# Patient Record
Sex: MALE | Race: WHITE | NOT HISPANIC OR LATINO | Employment: OTHER | URBAN - METROPOLITAN AREA
[De-identification: names, ages, dates, MRNs, and addresses within clinical notes are randomized per-mention and may not be internally consistent; named-entity substitution may affect disease eponyms.]

---

## 2018-01-25 ENCOUNTER — OFFICE VISIT (OUTPATIENT)
Dept: NEUROSURGERY | Facility: CLINIC | Age: 53
End: 2018-01-25

## 2018-01-25 VITALS — HEIGHT: 67 IN

## 2018-01-25 DIAGNOSIS — M80.00XA OSTEOPOROSIS WITH CURRENT PATHOLOGICAL FRACTURE, UNSPECIFIED OSTEOPOROSIS TYPE, INITIAL ENCOUNTER: Primary | ICD-10-CM

## 2018-01-25 PROBLEM — M48.54XD: Status: ACTIVE | Noted: 2018-01-25

## 2018-01-25 PROCEDURE — 99243 OFF/OP CNSLTJ NEW/EST LOW 30: CPT | Performed by: NEUROLOGICAL SURGERY

## 2018-01-25 RX ORDER — LEVOCETIRIZINE DIHYDROCHLORIDE 5 MG/1
TABLET, FILM COATED ORAL
COMMUNITY
Start: 2018-01-17

## 2018-01-25 RX ORDER — OXYCODONE HYDROCHLORIDE AND ACETAMINOPHEN 5; 325 MG/1; MG/1
TABLET ORAL
COMMUNITY
Start: 2018-01-22 | End: 2018-03-20

## 2018-01-25 NOTE — PROGRESS NOTES
Subjective   Patient ID: Jeet Bustillo is a 52 y.o. male is being seen for consultation today at the request of Ivelisse Ferguson*  Chief Complaint: Back pain    History of Present Illness: The patient is a 52-year-old man from [place] who was lifting a ladder about 2-1/2 months ago when he developed sudden pain in his back.  2 weeks later and MRI scan of his lumbar spine showed a superior aspect compression fracture of T12 with minimal loss of body height no more than 10 or 15%.  Pain is been in his back and radiates to the right flank.  It is much better than it was when it began.  He had diagnostic studies for osteoporosis and was found to have significant osteoporosis for reasons unknown, and for which she is referred to rheumatology to be seen tomorrow.  He had x-rays of his lumbar spine about a month after the event which I do not have access to, but apparently there was no finding of a progressive kyphosis.    Review of Radiographic Studies:  Lumbar MRI scan on 11/28/17 shows a minor superior vertebral body compression fracture at T12.    The following portions of the patient's history were reviewed, updated as appropriate and approved: allergies, current medications, past family history, past medical history, past social history, past surgical history, review of systems and problem list.  Review of Systems   Constitutional: Negative for activity change, appetite change, chills, diaphoresis, fatigue, fever and unexpected weight change.   HENT: Negative for congestion, dental problem, drooling, ear discharge, ear pain, facial swelling, hearing loss, mouth sores, nosebleeds, postnasal drip, rhinorrhea, sinus pressure, sneezing, sore throat, tinnitus, trouble swallowing and voice change.    Eyes: Negative for photophobia, pain, discharge, redness, itching and visual disturbance.   Respiratory: Negative for apnea, cough, choking, chest tightness, shortness of breath, wheezing and stridor.    Cardiovascular:  Negative for chest pain, palpitations and leg swelling.   Gastrointestinal: Negative for abdominal distention, abdominal pain, anal bleeding, blood in stool, constipation, diarrhea, nausea, rectal pain and vomiting.   Endocrine: Negative for cold intolerance, heat intolerance, polydipsia, polyphagia and polyuria.   Genitourinary: Negative for decreased urine volume, difficulty urinating, dysuria, enuresis, flank pain, frequency, genital sores, hematuria and urgency.   Musculoskeletal: Positive for back pain. Negative for arthralgias, gait problem, joint swelling, myalgias, neck pain and neck stiffness.   Skin: Negative for color change, pallor, rash and wound.   Allergic/Immunologic: Negative for environmental allergies, food allergies and immunocompromised state.   Neurological: Negative for dizziness, tremors, seizures, syncope, facial asymmetry, speech difficulty, weakness, light-headedness, numbness and headaches.   Hematological: Negative for adenopathy. Does not bruise/bleed easily.   Psychiatric/Behavioral: Negative for agitation, behavioral problems, confusion, decreased concentration, dysphoric mood, hallucinations, self-injury, sleep disturbance and suicidal ideas. The patient is not nervous/anxious and is not hyperactive.        Objective     NEUROLOGICAL EXAMINATION:      MENTAL STATUS:  Alert and oriented.  Speech intact.  Recent and remote memory intact.      CRANIAL NERVES:  Cranial nerve II:  Visual fields are full to confrontation.  Cranial nerves III, IV and VI:  PERRLADC.  Extraocular movements are intact.  Nystagmus is not present.  Cranial nerve V:  Facial sensation is intact to light touch.  Cranial nerve VII:  Muscles of facial expression reveal no asymmetry.  Cranial nerve VIII:  Hearing is intact to finger rub bilaterally.  Cranial nerves IX and X:  Palate elevates symmetrically.  Cranial nerve XI:  Shoulder shrug is intact.  Cranial nerve XII:  Tongue is midline without evidence of atrophy  or fasciculation.    MUSCULOSKELETAL:  SLR negative. Scott's test negative.    MOTOR:  Deltoid, biceps, triceps, and  strength intact.  No hand atrophy.  Hip flexion, knee extension, ankle dorsiflexion and plantar flexion intact. No tremor, spasticity, ataxia, or dysmetria.    SENSATION: Intact to touch upper and lower extremities.  Position sense intact.    REFLEXES:  DTR Intact and symmetrical in upper and lower extremities. Negative Babinski bilaterally    Assessment   Osteoporotic pathologic compression fracture T12, minimal loss of body height, no spinal canal compromise, no kyphosis.       Plan   No surgical treatment needed for the compression fracture, it is improving spontaneously as expected, and should heal without need for further intervention.  Prevention of future fractures by treatment of the osteoporosis should be the focus of treatment now.       Balaji Wyatt MD

## 2018-03-20 ENCOUNTER — OFFICE VISIT (OUTPATIENT)
Dept: SURGERY | Facility: CLINIC | Age: 53
End: 2018-03-20

## 2018-03-20 VITALS
TEMPERATURE: 97.8 F | HEART RATE: 110 BPM | WEIGHT: 188.8 LBS | OXYGEN SATURATION: 98 % | HEIGHT: 66 IN | BODY MASS INDEX: 30.34 KG/M2 | DIASTOLIC BLOOD PRESSURE: 88 MMHG | SYSTOLIC BLOOD PRESSURE: 130 MMHG

## 2018-03-20 DIAGNOSIS — K21.00 GASTROESOPHAGEAL REFLUX DISEASE WITH ESOPHAGITIS: Primary | ICD-10-CM

## 2018-03-20 PROCEDURE — 99244 OFF/OP CNSLTJ NEW/EST MOD 40: CPT | Performed by: SURGERY

## 2018-03-20 RX ORDER — BACLOFEN 10 MG/1
TABLET ORAL NIGHTLY
COMMUNITY
Start: 2018-02-19 | End: 2022-03-26

## 2018-03-20 NOTE — PROGRESS NOTES
Patient: Jeet Bustillo    YOB: 1965    Date: 03/20/2018    Primary Care Provider: Ivelisse Quinteros MD    Reason for Consultation:Epigastric pain, GERD    Chief Complaint   Patient presents with   • Mass     Esophageal mass       Subjective .     History of present illness:  I saw the patient in the office  today as a consultation for evaluation and treatment of an esophageal mass.  He had a ct scan that shows a possible ingested material or small calcified mass in the upper thoracic esophagus.  He denies any dysphagia or chest pain.  He does have acid reflux occasionally after eating.He does not have to take medications, there is epigastric discomfort and belching, this is sharp in nature, this seems to be associated with heavy meals late at night, nonradiating in nature.    The following portions of the patient's history were reviewed and updated as appropriate: allergies, current medications, past family history, past medical history, past social history, past surgical history and problem list.      Review of Systems   Constitutional: Negative for chills, fever and unexpected weight change.   HENT: Negative for voice change.    Eyes: Negative for visual disturbance.   Respiratory: Negative for apnea, cough, chest tightness, shortness of breath and wheezing.    Cardiovascular: Negative for chest pain, palpitations and leg swelling.   Gastrointestinal: Negative for abdominal distention, abdominal pain, anal bleeding, blood in stool, constipation, diarrhea, nausea, rectal pain and vomiting.   Endocrine: Negative for cold intolerance and heat intolerance.   Genitourinary: Negative for difficulty urinating, dysuria, flank pain, scrotal swelling and testicular pain.   Musculoskeletal: Negative for back pain, gait problem and joint swelling.   Skin: Negative for color change, rash and wound.   Neurological: Negative for dizziness, syncope, speech difficulty, weakness, numbness and headaches.    Hematological: Negative for adenopathy. Does not bruise/bleed easily.   Psychiatric/Behavioral: Negative for confusion. The patient is not nervous/anxious.        History:  Past Medical History:   Diagnosis Date   • Osteoporosis        Past Surgical History:   Procedure Laterality Date   • APPENDECTOMY         Family History   Problem Relation Age of Onset   • Cancer Mother    • Cancer Father        Social History   Substance Use Topics   • Smoking status: Current Every Day Smoker     Packs/day: 1.00     Years: 25.00     Types: Cigarettes   • Smokeless tobacco: Never Used   • Alcohol use No       Allergies:  Allergies   Allergen Reactions   • Sulfa Antibiotics Anaphylaxis   • Penicillins        Medications:    Current Outpatient Prescriptions:   •  baclofen (LIORESAL) 10 MG tablet, , Disp: , Rfl:   •  Calcium-Vitamin D 600-200 MG-UNIT per tablet, , Disp: , Rfl:   •  levocetirizine (XYZAL) 5 MG tablet, , Disp: , Rfl:   •  SM CALCIUM-VITAMIN D 600-400 MG-UNIT tablet, , Disp: , Rfl:   •  teriparatide (FORTEO) 600 MCG/2.4ML injection, Inject 20 mcg under the skin Daily., Disp: , Rfl:     Objective     Vital Signs:   Temp:  [97.8 °F (36.6 °C)] 97.8 °F (36.6 °C)  Heart Rate:  [110] 110  BP: (130)/(88) 130/88    Physical Exam:   General Appearance:    Alert, cooperative, in no acute distress   Head:    Normocephalic, without obvious abnormality, atraumatic   Eyes:            Lids and lashes normal, conjunctivae and sclerae normal, no   icterus, no pallor, corneas clear, PERRLA   Ears:    Ears appear intact with no abnormalities noted   Throat:   No oral lesions, no thrush, oral mucosa moist   Neck:   No adenopathy, supple, trachea midline, no thyromegaly, no   carotid bruit, no JVD   Lungs:     Clear to auscultation,respirations regular, even and                  unlabored    Heart:    Regular rhythm and normal rate, normal S1 and S2, no            murmur, no gallop, no rub, no click   Chest Wall:    No abnormalities  observed   Abdomen:     Normal bowel sounds, no masses, no organomegaly, soft        non-tender, non-distended, no guarding, there is evidence of epigastric  tenderness   Extremities:   Moves all extremities well, no edema, no cyanosis, no             redness   Pulses:   Pulses palpable and equal bilaterally   Skin:   No bleeding, bruising or rash   Lymph nodes:   No palpable adenopathy   Neurologic:   Cranial nerves 2 - 12 grossly intact, sensation intact     Results Review:   I reviewed the patient's new clinical results.  I reviewed the patient's new imaging results and agree with the interpretation.  I reviewed the patient's other test results and agree with the interpretation    Review of Systems was reviewed and confirmed as accurate today.    Assessment/Plan     1. Gastroesophageal reflux disease with esophagitis        I did have a detailed and extensive discussion with the patient in the office and they understand that they need to undergo upper endoscopy. Full risks and benefits of operative versus nonoperative intervention were discussed with the patient and these include bleeding and esophageal injury. The patient understands, agrees, and wishes to proceed with the surgical treatment plan as mentioned above. The patient had no questions for me at the end of the discussion.       I discussed the patients findings and my recommendations with patient.     Electronically signed by Carlton Resendiz MD  03/20/18 12:52 PM      Scribed for Carlton Resendiz MD by Michelle Milligan. 3/20/2018  1:20 PM

## 2018-03-30 ENCOUNTER — OUTSIDE FACILITY SERVICE (OUTPATIENT)
Dept: SURGERY | Facility: CLINIC | Age: 53
End: 2018-03-30

## 2018-03-30 PROCEDURE — 43251 EGD REMOVE LESION SNARE: CPT | Performed by: SURGERY

## 2018-03-30 PROCEDURE — 43239 EGD BIOPSY SINGLE/MULTIPLE: CPT | Performed by: SURGERY

## 2018-04-09 ENCOUNTER — CONSULT (OUTPATIENT)
Dept: ONCOLOGY | Facility: CLINIC | Age: 53
End: 2018-04-09

## 2018-04-09 VITALS
HEIGHT: 66 IN | DIASTOLIC BLOOD PRESSURE: 89 MMHG | BODY MASS INDEX: 29.8 KG/M2 | RESPIRATION RATE: 18 BRPM | SYSTOLIC BLOOD PRESSURE: 149 MMHG | HEART RATE: 97 BPM | TEMPERATURE: 97.8 F | WEIGHT: 185.4 LBS

## 2018-04-09 DIAGNOSIS — D72.9 NEUTROPHILIC LEUKOCYTOSIS: Primary | ICD-10-CM

## 2018-04-09 DIAGNOSIS — M80.00XD AGE-RELATED OSTEOPOROSIS WITH CURRENT PATHOLOGICAL FRACTURE WITH ROUTINE HEALING, SUBSEQUENT ENCOUNTER: ICD-10-CM

## 2018-04-09 PROBLEM — M81.0 OSTEOPOROSIS: Status: ACTIVE | Noted: 2018-04-09

## 2018-04-09 PROCEDURE — 99204 OFFICE O/P NEW MOD 45 MIN: CPT | Performed by: INTERNAL MEDICINE

## 2018-04-09 NOTE — PROGRESS NOTES
ID: 52 y.o. year old male from [facility] [address]    PCP: Ivelisse Quinteros MD    REFERRING PHYSICIAN: Dr. Quinteros    Reason for Consultation: Leukocytosis    Dear Dr. Quinteros    It is a pleasure to meet Mr. Bustillo today.  As you know he is a very pleasant 52-year-old gentleman who I'm seeing in consultation for mild leukocytosis.  This appears to be a mixed leukocytosis with neutrophilia, mild lymphocytosis and monocytosis ongoing.  He does smoke 2 packs per day.  He also has an unusual finding of severe osteoporosis at a young age in a male.  He reports a CAT scan of his chest showing calcified granulomas consistent with histoplasmosis.  Otherwise other than the fracture of his back he has no significant medical issues.  Denies any recent steroid use.  Denies any recent infections.      Past Medical History:   Diagnosis Date   • Osteoporosis        Past Surgical History:   Procedure Laterality Date   • APPENDECTOMY         Social History     Social History   • Marital status:      Social History Main Topics   • Smoking status: Current Every Day Smoker     Packs/day: 2.00     Years: 25.00     Types: Cigarettes   • Smokeless tobacco: Never Used   • Alcohol use No   • Drug use: No   • Sexual activity: Defer     Other Topics Concern   • Not on file       Family History   Problem Relation Age of Onset   • Lymphoma Mother    • Lung cancer Father        Review of Systems:    16 point review of systems was performed and reviewed and scanned into the EMR      Current Outpatient Prescriptions:   •  baclofen (LIORESAL) 10 MG tablet, Every Night., Disp: , Rfl:   •  Calcium-Vitamin D 600-200 MG-UNIT per tablet, , Disp: , Rfl:   •  levocetirizine (XYZAL) 5 MG tablet, , Disp: , Rfl:   •  SM CALCIUM-VITAMIN D 600-400 MG-UNIT tablet, , Disp: , Rfl:   •  teriparatide (FORTEO) 600 MCG/2.4ML injection, Inject 20 mcg under the skin Daily., Disp: , Rfl:     Allergies   Allergen Reactions   • Sulfa Antibiotics  Anaphylaxis   • Penicillins Unknown (See Comments)     Pt cannot remember, he thinks he had a rash as a child       ECOG SCORE: 0    Objective     Vitals:    04/09/18 0927   BP: 149/89   Pulse: 97   Resp: 18   Temp: 97.8 °F (36.6 °C)       Physical Exam     General: well appearing, in no acute distress  HEENT: sclera anicteric, oropharynx clear, neck is supple  Lymphatics: no cervical, supraclavicular, or axillary adenopathy  Cardiovascular: regular rate and rhythm, no murmurs, rubs or gallops  Lungs: clear to auscultation bilaterally  Abdomen: soft, nontender, nondistended.  No palpable organomegaly  Extremities: no lower extremity edema  Skin: no rashes, lesions, bruising, or petechiae  Msk:  Shows no weakness of the large muscle groups  Psych: Mood is stable    White blood cell count of 16.7 with hemoglobin of 15.4 and platelet count of 360      ASSESSMENT:    52-year-old gentleman with mild leukocytosis with a differential that is consistent with likely an underlying inflammatory state.  Though underlying CMML could be a possibility though with his numbers back down from a high of 22 this is less likely.  Most common cause of mild leukocytosis in this population is smoking.  Patient is a heavy tobacco abuse or with a 2 pack per day habit which is likely contributing to it.  The mechanism of action for this is unknown at this time.  It is also unusual for a male in this age group to have severe osteoporosis.  I'm going to check an ACE level and also PTH to see your sarcoidosis or parathyroid issues is aggravating this process.  I like to repeat his counts in 2 months to see if it improves.  I also had a long discussion with him about smoking cessation.  I think from an overall health standpoint this will probably result in the biggest benefit for him.      Thank you for allowing me to participate in the care of this patient.    Yours sincerely,    Eva Mesa MD  [facility]  Hematology and  Oncology        Please note that portions of this note may have been completed with a voice recognition program. Efforts were made to edit the dictations, but occasionally words are mistranscribed.

## 2018-04-12 ENCOUNTER — OFFICE VISIT (OUTPATIENT)
Dept: SURGERY | Facility: CLINIC | Age: 53
End: 2018-04-12

## 2018-04-12 VITALS
BODY MASS INDEX: 29.73 KG/M2 | SYSTOLIC BLOOD PRESSURE: 140 MMHG | OXYGEN SATURATION: 99 % | WEIGHT: 185 LBS | TEMPERATURE: 98.2 F | HEART RATE: 89 BPM | DIASTOLIC BLOOD PRESSURE: 80 MMHG | HEIGHT: 66 IN

## 2018-04-12 DIAGNOSIS — K21.00 GASTROESOPHAGEAL REFLUX DISEASE WITH ESOPHAGITIS: Primary | ICD-10-CM

## 2018-04-12 PROCEDURE — 99213 OFFICE O/P EST LOW 20 MIN: CPT | Performed by: SURGERY

## 2018-04-12 RX ORDER — NICOTINE 21 MG/24HR
PATCH, TRANSDERMAL 24 HOURS TRANSDERMAL
COMMUNITY
Start: 2018-03-21 | End: 2022-03-26

## 2018-04-12 NOTE — PROGRESS NOTES
Patient: Jeet Bustillo    YOB: 1965    Date: 04/12/2018    Primary Care Provider: Ivelisse Quinteros MD    Reason for Consultation: Follow-up EGD    Chief Complaint   Patient presents with   • Follow-up     follow up egd       History of present illness:  I saw the patient in the office today as a followup from their recent EGD with biopsy, the pathology report did show H-pylori positive,chronic active gastritis and squamous mucosa with mild nonspecific reactive changes. Patient has a hx of GERD and an esophageal mass. They state that they have done well and he has quit smoking and started to exercise more regularly.    The following portions of the patient's history were reviewed and updated as appropriate: allergies, current medications, past family history, past medical history, past social history, past surgical history and problem list.      Review of Systems   Constitutional: Negative for chills, fatigue and fever.   Respiratory: Negative for cough.    Cardiovascular: Negative for chest pain.   Gastrointestinal: Negative for abdominal pain, diarrhea, nausea and vomiting.       Vital Signs:   Temp:  [98.2 °F (36.8 °C)] 98.2 °F (36.8 °C)  Heart Rate:  [89] 89  BP: (140)/(80) 140/80    Allergies:  Allergies   Allergen Reactions   • Sulfa Antibiotics Anaphylaxis   • Penicillins Unknown (See Comments)     Pt cannot remember, he thinks he had a rash as a child       Medications:    Current Outpatient Prescriptions:   •  baclofen (LIORESAL) 10 MG tablet, Every Night., Disp: , Rfl:   •  Calcium-Vitamin D 600-200 MG-UNIT per tablet, , Disp: , Rfl:   •  levocetirizine (XYZAL) 5 MG tablet, , Disp: , Rfl:   •  nicotine (NICODERM CQ) 21 MG/24HR patch, , Disp: , Rfl:   •  SM CALCIUM-VITAMIN D 600-400 MG-UNIT tablet, , Disp: , Rfl:   •  teriparatide (FORTEO) 600 MCG/2.4ML injection, Inject 20 mcg under the skin Daily., Disp: , Rfl:     Physical Exam:   General Appearance:    Alert, cooperative, in no  acute distress   Abdomen:     no masses, no organomegaly, soft non-tender, non-distended, no guarding, wounds are well healed, no evidence of recurrent hernia   Chest:      Clear toausculation            Cor:  Regular rate and rhythm      Results Review:   I reviewed the patient's new clinical results.  I reviewed the patient's new imaging results and agree with the interpretation.  I reviewed the patient's other test results and agree with the interpretation    Assessment / Plan:    1. Gastroesophageal reflux disease with esophagitis        I did discuss the situation with the patient today in the office and they have done well from their recent EGD with biopsy. I have told the patient That he does not need any further intervention at this time.  I have elected not to treat his helical back or since he is asymptomatic, the small polyp noted in the esophagus was benign and I don't know whether this corresponds to the area of CT scan finding.  I did tell the patient that he had a large amount of snoring during the procedure and difficulty keeping up his saturations and I wonder if he wouldn't benefit from a sleep study in the near future.    Electronically signed by Carlton Resendiz MD  04/12/18

## 2018-06-19 ENCOUNTER — LAB (OUTPATIENT)
Dept: LAB | Facility: HOSPITAL | Age: 53
End: 2018-06-19

## 2018-06-19 DIAGNOSIS — D72.9 NEUTROPHILIC LEUKOCYTOSIS: ICD-10-CM

## 2018-06-19 DIAGNOSIS — M80.00XD AGE-RELATED OSTEOPOROSIS WITH CURRENT PATHOLOGICAL FRACTURE WITH ROUTINE HEALING, SUBSEQUENT ENCOUNTER: ICD-10-CM

## 2018-06-19 LAB
BASOPHILS # BLD AUTO: 0.07 10*3/MM3 (ref 0–0.2)
BASOPHILS NFR BLD AUTO: 0.5 % (ref 0–2.5)
CRP SERPL-MCNC: 0.7 MG/DL (ref 0–1)
DEPRECATED RDW RBC AUTO: 41.8 FL (ref 37–54)
EOSINOPHIL # BLD AUTO: 0.49 10*3/MM3 (ref 0–0.7)
EOSINOPHIL NFR BLD AUTO: 3.8 % (ref 0–7)
ERYTHROCYTE [DISTWIDTH] IN BLOOD BY AUTOMATED COUNT: 12.8 % (ref 11.5–14.5)
ERYTHROCYTE [SEDIMENTATION RATE] IN BLOOD: 15 MM/HR (ref 0–15)
HCT VFR BLD AUTO: 41.1 % (ref 42–52)
HGB BLD-MCNC: 14.4 G/DL (ref 14–18)
IMM GRANULOCYTES # BLD: 0.07 10*3/MM3 (ref 0–0.06)
IMM GRANULOCYTES NFR BLD: 0.5 % (ref 0–0.6)
LDH SERPL-CCNC: 337 U/L (ref 313–618)
LYMPHOCYTES # BLD AUTO: 4.51 10*3/MM3 (ref 0.6–3.4)
LYMPHOCYTES NFR BLD AUTO: 35.3 % (ref 10–50)
MCH RBC QN AUTO: 31.6 PG (ref 27–31)
MCHC RBC AUTO-ENTMCNC: 35 G/DL (ref 30–37)
MCV RBC AUTO: 90.3 FL (ref 80–94)
MONOCYTES # BLD AUTO: 1.27 10*3/MM3 (ref 0–0.9)
MONOCYTES NFR BLD AUTO: 10 % (ref 0–12)
NEUTROPHILS # BLD AUTO: 6.35 10*3/MM3 (ref 2–6.9)
NEUTROPHILS NFR BLD AUTO: 49.9 % (ref 37–80)
NRBC BLD MANUAL-RTO: 0 /100 WBC (ref 0–0)
PLATELET # BLD AUTO: 357 10*3/MM3 (ref 130–400)
PMV BLD AUTO: 11 FL (ref 6–12)
RBC # BLD AUTO: 4.55 10*6/MM3 (ref 4.7–6.1)
WBC NRBC COR # BLD: 12.76 10*3/MM3 (ref 4.8–10.8)

## 2018-06-19 PROCEDURE — 86140 C-REACTIVE PROTEIN: CPT

## 2018-06-19 PROCEDURE — 82164 ANGIOTENSIN I ENZYME TEST: CPT

## 2018-06-19 PROCEDURE — 36415 COLL VENOUS BLD VENIPUNCTURE: CPT

## 2018-06-19 PROCEDURE — 85651 RBC SED RATE NONAUTOMATED: CPT

## 2018-06-19 PROCEDURE — 85025 COMPLETE CBC W/AUTO DIFF WBC: CPT

## 2018-06-19 PROCEDURE — 83970 ASSAY OF PARATHORMONE: CPT

## 2018-06-19 PROCEDURE — 83615 LACTATE (LD) (LDH) ENZYME: CPT

## 2018-06-21 ENCOUNTER — OFFICE VISIT (OUTPATIENT)
Dept: ONCOLOGY | Facility: CLINIC | Age: 53
End: 2018-06-21

## 2018-06-21 VITALS
DIASTOLIC BLOOD PRESSURE: 75 MMHG | BODY MASS INDEX: 30.37 KG/M2 | TEMPERATURE: 97.2 F | WEIGHT: 189 LBS | HEIGHT: 66 IN | SYSTOLIC BLOOD PRESSURE: 128 MMHG | HEART RATE: 87 BPM | RESPIRATION RATE: 16 BRPM

## 2018-06-21 DIAGNOSIS — D72.9 NEUTROPHILIC LEUKOCYTOSIS: Primary | ICD-10-CM

## 2018-06-21 LAB
ACE SERPL-CCNC: 39 U/L (ref 14–82)
PTH-INTACT SERPL-MCNC: 21 PG/ML (ref 15–65)

## 2018-06-21 PROCEDURE — 99213 OFFICE O/P EST LOW 20 MIN: CPT | Performed by: INTERNAL MEDICINE

## 2018-06-21 NOTE — PROGRESS NOTES
"PROBLEM LIST:     1.  Mild leukocytosis secondary to likely smoking or underlying inflammatory state.  2.  Patient quit smoking in April 2018  3.  Osteoporosis with hypercalcemia-patient is being worked up by endocrinology at .  PTH and Ace levels being drawn.    REASON FOR VISIT: Leukocytosis    HISTORY OF PRESENT ILLNESS:   52 y.o.  male presents today for follow-up of his mild leukocytosis.  Clinically doing well.  He has quit smoking since I last saw him.  He reports that he is feeling much better.  No significant issues ongoing at this time.  He also saw endocrinology who is doing a workup for his hypercalcemia.    Past medical history, social history and family history was reviewed and unchanged from prior visit.    Review of Systems:    Review of Systems - Oncology   A comprehensive 14 point review of systems was performed and was negative except as mentioned.      Medications:  The current medication list was reviewed in the EMR    ALLERGIES:    Allergies   Allergen Reactions   • Sulfa Antibiotics Anaphylaxis   • Penicillins Unknown (See Comments)     Pt cannot remember, he thinks he had a rash as a child         Physical Exam    VITAL SIGNS:  /75   Pulse 87   Temp 97.2 °F (36.2 °C) (Temporal Artery )   Resp 16   Ht 167.6 cm (65.98\")   Wt 85.7 kg (189 lb)   BMI 30.52 kg/m²     Wt Readings from Last 3 Encounters:   06/21/18 85.7 kg (189 lb)   04/12/18 83.9 kg (185 lb)   04/09/18 84.1 kg (185 lb 6.4 oz)        Performance Status: 0    General: well appearing, in no acute distress  HEENT: sclera anicteric, oropharynx clear, neck is supple  Lymphatics: no cervical, supraclavicular, or axillary adenopathy  Cardiovascular: regular rate and rhythm, no murmurs, rubs or gallops  Lungs: clear to auscultation bilaterally  Abdomen: soft, nontender, nondistended.  No palpable organomegaly  Extremities: no lower extremity edema  Skin: no rashes, lesions, bruising, or petechiae  Msk:  Shows no weakness of the " large muscle groups  Psych: Mood is stable        RECENT LABS:    Lab Results   Component Value Date    HGB 14.4 06/19/2018    HCT 41.1 (L) 06/19/2018    MCV 90.3 06/19/2018     06/19/2018    WBC 12.76 (H) 06/19/2018    NEUTROABS 6.35 06/19/2018    LYMPHSABS 4.51 (H) 06/19/2018    MONOSABS 1.27 (H) 06/19/2018    EOSABS 0.49 06/19/2018    BASOSABS 0.07 06/19/2018         Assessment/Plan    1.  Mild leukocytosis: His numbers have improved since I last saw him.  His neutrophil count is back to normal.  Still has mildly elevated lymphocytes and monocytes.  That may take some time to resolve.    2.  Smoking cessation: Patient continues to do well off cigarettes for the last 2 months.    3.  Hypercalcemia: Patient is being worked up by endocrinology at .  I drew a PTH and Ace level today.        Eva Mesa MD  [facility] Hematology and Oncology    Return on: 12/06/18    Orders Placed This Encounter   Procedures   • CBC & Differential       6/21/2018         Please note that portions of this note may have been completed with a voice recognition program. Efforts were made to edit the dictations, but occasionally words are mistranscribed.

## 2021-05-25 ENCOUNTER — TRANSCRIBE ORDERS (OUTPATIENT)
Dept: ADMINISTRATIVE | Facility: HOSPITAL | Age: 56
End: 2021-05-25

## 2021-05-25 DIAGNOSIS — D36.13 NEUROMA OF FOOT: ICD-10-CM

## 2021-05-25 DIAGNOSIS — M77.52 BURSITIS OF BOTH FEET: Primary | ICD-10-CM

## 2021-05-25 DIAGNOSIS — M77.51 BURSITIS OF BOTH FEET: Primary | ICD-10-CM

## 2021-05-27 ENCOUNTER — HOSPITAL ENCOUNTER (OUTPATIENT)
Dept: ULTRASOUND IMAGING | Facility: HOSPITAL | Age: 56
Discharge: HOME | End: 2021-05-27
Admitting: PODIATRIST

## 2021-05-27 DIAGNOSIS — M77.52 BURSITIS OF BOTH FEET: ICD-10-CM

## 2021-05-27 DIAGNOSIS — M77.51 BURSITIS OF BOTH FEET: ICD-10-CM

## 2021-05-27 DIAGNOSIS — D36.13 NEUROMA OF FOOT: ICD-10-CM

## 2021-05-27 PROCEDURE — 76881 US COMPL JOINT R-T W/IMG: CPT

## 2021-10-27 ENCOUNTER — OFFICE VISIT (OUTPATIENT)
Dept: BEHAVIORAL HEALTH | Facility: CLINIC | Age: 56
End: 2021-10-27

## 2021-10-27 VITALS — WEIGHT: 182 LBS | BODY MASS INDEX: 29.25 KG/M2 | HEIGHT: 66 IN

## 2021-10-27 DIAGNOSIS — F33.0 MILD EPISODE OF RECURRENT MAJOR DEPRESSIVE DISORDER (HCC): Primary | ICD-10-CM

## 2021-10-27 PROCEDURE — 90791 PSYCH DIAGNOSTIC EVALUATION: CPT | Performed by: COUNSELOR

## 2021-10-27 RX ORDER — CHOLECALCIFEROL (VITAMIN D3) 50 MCG
TABLET ORAL DAILY
COMMUNITY
Start: 2021-09-29

## 2021-10-27 RX ORDER — SERTRALINE HYDROCHLORIDE 25 MG/1
25 TABLET, FILM COATED ORAL DAILY
COMMUNITY
Start: 2021-09-09 | End: 2022-01-20

## 2021-10-27 RX ORDER — ASPIRIN 81 MG/1
81 TABLET, COATED ORAL DAILY
COMMUNITY
Start: 2021-10-20 | End: 2022-01-20

## 2021-10-27 RX ORDER — ATORVASTATIN CALCIUM 20 MG/1
20 TABLET, FILM COATED ORAL
COMMUNITY
Start: 2021-10-20 | End: 2022-01-20

## 2021-10-27 RX ORDER — OMEPRAZOLE 20 MG/1
CAPSULE, DELAYED RELEASE ORAL
COMMUNITY
Start: 2021-09-29

## 2021-10-27 RX ORDER — TAMSULOSIN HYDROCHLORIDE 0.4 MG/1
CAPSULE ORAL
COMMUNITY
End: 2022-01-20

## 2021-10-27 RX ORDER — QUETIAPINE FUMARATE 25 MG/1
25 TABLET, FILM COATED ORAL 3 TIMES DAILY
COMMUNITY
Start: 2021-09-29 | End: 2022-01-20

## 2021-10-27 RX ORDER — MONTELUKAST SODIUM 10 MG/1
10 TABLET ORAL DAILY
COMMUNITY
Start: 2021-09-29

## 2021-10-27 RX ORDER — QUETIAPINE FUMARATE 100 MG/1
100 TABLET, FILM COATED ORAL
COMMUNITY
Start: 2021-09-21 | End: 2022-01-20

## 2021-12-15 NOTE — PROGRESS NOTES
Initial Therapy Office Visit      Date: 10/27/2021     Patient Name: Jeet Bustillo  : 1965   Time In: 850  Time Out: 940    PCP: Ivelisse Quinteros MD    Chief Complaint:     ICD-10-CM ICD-9-CM   1. Mild episode of recurrent major depressive disorder (HCC)  F33.0 296.31       History of Present Illness: Jeet Bustillo is a 56 y.o. male who presents today for initial therapy session. Patient arrived for session on time, clean and casually dressed without evidence of intoxication, withdrawal, or perceptual disturbance. Patient was cooperative and agreeable to treatment and interacted with therapist.  The patient was seen at the [place] office today.  The patient reports that he has massive self-esteem issue.  The patient reports that he has been  for 2-1/2 year and is being his anxiety is whether or not he is going to find somebody else.  Currently the patient's focus is on his special needs son who is 27.      Subjective      Review of Systems:   The following portions of the patient's history were reviewed and updated as appropriate: allergies, current medications, past family history, past medical history, past social history, past surgical history and problem list.    Past Medical History:   Past Medical History:   Diagnosis Date   • Osteoporosis        Past Surgical History:   Past Surgical History:   Procedure Laterality Date   • APPENDECTOMY         Family History:   Family History   Problem Relation Age of Onset   • Lymphoma Mother    • Lung cancer Father        Social History:   Social History     Socioeconomic History   • Marital status:    Tobacco Use   • Smoking status: Current Every Day Smoker     Packs/day: 2.00     Years: 25.00     Pack years: 50.00     Types: Cigarettes   • Smokeless tobacco: Never Used   Substance and Sexual Activity   • Alcohol use: No   • Drug use: No   • Sexual activity: Defer       Trauma History:  yes    Spiritual:  unknown    Mental Illness  and/or Substance Abuse: The patient has been diagnosed with depression.      History: No    Medication:     Current Outpatient Medications:   •  Aspirin Low Dose 81 MG EC tablet, Take 81 mg by mouth Daily., Disp: , Rfl:   •  atorvastatin (LIPITOR) 20 MG tablet, Take 20 mg by mouth every night at bedtime., Disp: , Rfl:   •  baclofen (LIORESAL) 10 MG tablet, Every Night., Disp: , Rfl:   •  Calcium Carbonate 1500 (600 Ca) MG tablet, Take 600 mg by mouth Daily., Disp: , Rfl:   •  Calcium-Vitamin D 600-200 MG-UNIT per tablet, , Disp: , Rfl:   •  Cholecalciferol (Vitamin D) 50 MCG (2000 UT) tablet, Take  by mouth Daily., Disp: , Rfl:   •  levocetirizine (XYZAL) 5 MG tablet, , Disp: , Rfl:   •  montelukast (SINGULAIR) 10 MG tablet, Take 10 mg by mouth Daily., Disp: , Rfl:   •  nicotine (NICODERM CQ) 21 MG/24HR patch, , Disp: , Rfl:   •  omeprazole (priLOSEC) 20 MG capsule, TAKE ONE CAPSULE BY MOUTH EVERY MORNING 30 TO 60 MINUTES BEFORE A MEAL, Disp: , Rfl:   •  ProAir  (90 Base) MCG/ACT inhaler, INHALE 1-2 PUFFS EVERY 4 TO 6 HOURS AS NEEDED AND AS DIRECTED, Disp: , Rfl:   •  QUEtiapine (SEROquel) 100 MG tablet, Take 100 mg by mouth every night at bedtime., Disp: , Rfl:   •  QUEtiapine (SEROquel) 25 MG tablet, Take 25 mg by mouth 3 (Three) Times a Day., Disp: , Rfl:   •  sertraline (ZOLOFT) 25 MG tablet, Take 25 mg by mouth Daily., Disp: , Rfl:   •  SM CALCIUM-VITAMIN D 600-400 MG-UNIT tablet, , Disp: , Rfl:   •  tamsulosin (FLOMAX) 0.4 MG capsule 24 hr capsule, tamsulosin 0.4 mg capsule, Disp: , Rfl:   •  teriparatide (FORTEO) 600 MCG/2.4ML injection, Inject 20 mcg under the skin Daily., Disp: , Rfl:     Allergies:   Allergies   Allergen Reactions   • Sulfa Antibiotics Anaphylaxis   • Penicillins Unknown (See Comments)     Pt cannot remember, he thinks he had a rash as a child       Educational/Work History:  Highest level of education obtained: 12th grade  Employment Status: unemployed    Significant Life  "Events:   Patient been through or witnessed a divorce? yes  The patient  about 21/2 years ago.     Patient experienced a death / loss of relationship? yes  The patients parents  when he was 24years old. His father  of a heart attack, and his mother was 52.    Patient experienced a major accident or tragic events? no  None.     Patient experienced any other significant life events or trauma (such as verbal, physical, sexual abuse)? no  None.     Legal History:  The patient has no significant history of legal issues.  Court-ordered: No    PHQ-9 Score:   PHQ-9 Total Score:       CIARA 7: Total Score: unknown     Objective     Physical Exam:   Height 167.6 cm (66\"), weight 82.6 kg (182 lb). Body mass index is 29.38 kg/m².     Physical Exam    Patient's Support Network Includes:  son, and siblings    Prognosis: Good with Ongoing Treatment     Mental Status Exam:   Hygiene:   good  Cooperation:  Cooperative  Eye Contact:  Good  Psychomotor Behavior:  Appropriate  Affect:  Appropriate  Mood: normal  Hopelessness: Denies  Speech:  Normal  Thought Process:  Goal directed  Thought Content:  Normal  Suicidal:  None  Homicidal:  None  Hallucinations:  None  Delusion:  None  Memory:  Intact  Orientation:  Person, Place, Time and Situation  Reliability:  good  Insight:  Good  Judgement:  Good  Impulse Control:  Good  Physical/Medical Issues:  No      Assessment / Plan      Assessment/Plan:   Diagnoses and all orders for this visit:    1. Mild episode of recurrent major depressive disorder (HCC) (Primary)         1. The therapist will continue to promote the therapeutic alliance, address the patients issues and concerns, and work towards strengthening his self awareness, insights, and positive coping skills. These positive coping skills include music, art, breathing, exercising, and positive self talk.     TREATMENT PLAN/GOALS: Continue supportive psychotherapy efforts and medications as indicated. Treatment and " medication options discussed during today's visit. Patient ackowledged and verbally consented to continue with current treatment plan and was educated on the importance of compliance with treatment and follow-up appointments.     Counseled patient regarding multimodal approach with healthy nutrition, healthy sleep, regular physical activity, social activities, counseling, and medications.      Coping skills reviewed and encouraged positive framing of thoughts. No suicidal ideation or homicidal ideation at this time.      Assisted patient in processing above session content; acknowledged and normalized patient’s thoughts, feelings, and concerns.  Applied  positive coping skills and behavior management in session.    Allowed patient to freely discuss issues without interruption or judgment. Provided safe, confidential environment to facilitate the development of positive therapeutic relationship and encourage open, honest communication. Assisted patient in identifying risk factors which would indicate the need for higher level of care including thoughts to harm self or others and/or self-harming behavior and encouraged patient to contact this office, call 911, or present to the nearest emergency room should any of these events occur. Discussed crisis intervention services and means to access.     Follow Up:   Return in about 3 months (around 1/27/2022) for Recheck.    MITZY Bower  This document has been electronically signed by MITZY Bower  December 15, 2021 09:12 EST    Please note that portions of this note were completed with a voice recognition program. Efforts were made to edit dictation, but occasionally words are mistranscribed.

## 2022-01-19 NOTE — PROGRESS NOTES
Patient: Jeet Bustillo    YOB: 1965    Date: 01/20/2022    Primary Care Provider: Ivelisse Quinteros MD    Chief Complaint   Patient presents with   • Mass     bilateral neck       SUBJECTIVE:    History of present illness: On Sunday the patient noticed bilateral supraclavicular masses.  He has never had an issue in the past.  No pain.  Of note he has a strong family history of lymphoma his mother and uncle.  He has not noticed any other abnormality.  No weight loss.    The following portions of the patient's history were reviewed and updated as appropriate: allergies, current medications, past family history, past medical history, past social history, past surgical history and problem list.      Review of Systems   Constitutional: Negative for activity change, chills, fever and unexpected weight change.   HENT: Negative for hearing loss, trouble swallowing and voice change.    Eyes: Negative for visual disturbance.   Respiratory: Negative for apnea, cough, chest tightness, shortness of breath and wheezing.    Cardiovascular: Negative for chest pain, palpitations and leg swelling.   Gastrointestinal: Negative for abdominal distention, abdominal pain, anal bleeding, blood in stool, constipation, diarrhea, nausea, rectal pain and vomiting.   Endocrine: Negative for cold intolerance and heat intolerance.   Genitourinary: Negative for difficulty urinating, dysuria and flank pain.   Musculoskeletal: Negative for back pain and gait problem.   Skin: Negative for color change, rash and wound.   Neurological: Negative for dizziness, syncope, speech difficulty, weakness, light-headedness, numbness and headaches.   Hematological: Negative for adenopathy. Does not bruise/bleed easily.   Psychiatric/Behavioral: Negative for confusion. The patient is not nervous/anxious.        Allergies:  Allergies   Allergen Reactions   • Sulfa Antibiotics Anaphylaxis   • Penicillins Unknown (See Comments)     Pt cannot  "remember, he thinks he had a rash as a child       Medications:    Current Outpatient Medications:   •  baclofen (LIORESAL) 10 MG tablet, Every Night., Disp: , Rfl:   •  Calcium Carbonate 1500 (600 Ca) MG tablet, Take 600 mg by mouth Daily., Disp: , Rfl:   •  Cholecalciferol (Vitamin D) 50 MCG (2000 UT) tablet, Take  by mouth Daily., Disp: , Rfl:   •  levocetirizine (XYZAL) 5 MG tablet, , Disp: , Rfl:   •  montelukast (SINGULAIR) 10 MG tablet, Take 10 mg by mouth Daily., Disp: , Rfl:   •  nicotine (NICODERM CQ) 21 MG/24HR patch, , Disp: , Rfl:   •  omeprazole (priLOSEC) 20 MG capsule, TAKE ONE CAPSULE BY MOUTH EVERY MORNING 30 TO 60 MINUTES BEFORE A MEAL, Disp: , Rfl:   •  SM CALCIUM-VITAMIN D 600-400 MG-UNIT tablet, , Disp: , Rfl:     History:  Past Medical History:   Diagnosis Date   • Osteoporosis        Past Surgical History:   Procedure Laterality Date   • APPENDECTOMY         Family History   Problem Relation Age of Onset   • Lymphoma Mother    • Lung cancer Father        Social History     Tobacco Use   • Smoking status: Current Every Day Smoker     Packs/day: 2.00     Years: 25.00     Pack years: 50.00     Types: Cigarettes   • Smokeless tobacco: Never Used   Substance Use Topics   • Alcohol use: No   • Drug use: No        OBJECTIVE:    Vital Signs:   Vitals:    01/20/22 1334   BP: 138/88   Pulse: 111   Temp: 98 °F (36.7 °C)   TempSrc: Temporal   SpO2: 96%   Weight: 86.3 kg (190 lb 3.2 oz)   Height: 167.6 cm (66\")       Physical Exam:   General Appearance:    Alert, cooperative, in no acute distress   Head:    Normocephalic, without obvious abnormality, atraumatic   Eyes:            Normal.  No scleral icterus.  PERRLA    Neck:  Supple without obvious adenopathy.  No masses.  Supraclavicular area with what appears to be otherwise normal-appearing adipose tissue without mass.   Lungs:     Clear to auscultation,respirations regular, even and                  unlabored    Heart:    Regular rhythm and normal " rate, normal S1 and S2, no            murmur   Abdomen:     Normal bowel sounds, no masses, no organomegaly, soft        non-tender, non-distended, no guarding,    Extremities:   Moves all extremities well, no edema, no cyanosis, no             redness   Skin:   No bleeding, bruising or rash   Neurologic:   Normal without gross deficits.   Psychiatric: No evidence of depression or anxiety        Results Review:   I reviewed the patient's new clinical results.  Ultrasound was reviewed    Review of Systems was reviewed and confirmed as accurate as documented by the MA.    ASSESSMENT/PLAN:    1. Neck mass        I find no obvious abnormality on exam and ultrasound today was normal as well.  He states that this clearly is a change in the last several days.  I will go ahead and CT scan the neck.  Follow-up after that.  He is agreeable.        Electronically signed by Michelle L Milligan, MA  01/20/22

## 2022-01-20 ENCOUNTER — PATIENT ROUNDING (BHMG ONLY) (OUTPATIENT)
Dept: SURGERY | Facility: CLINIC | Age: 57
End: 2022-01-20

## 2022-01-20 ENCOUNTER — OFFICE VISIT (OUTPATIENT)
Dept: SURGERY | Facility: CLINIC | Age: 57
End: 2022-01-20

## 2022-01-20 VITALS
OXYGEN SATURATION: 96 % | HEART RATE: 111 BPM | HEIGHT: 66 IN | DIASTOLIC BLOOD PRESSURE: 88 MMHG | WEIGHT: 190.2 LBS | TEMPERATURE: 98 F | SYSTOLIC BLOOD PRESSURE: 138 MMHG | BODY MASS INDEX: 30.57 KG/M2

## 2022-01-20 DIAGNOSIS — R22.1 NECK MASS: Primary | ICD-10-CM

## 2022-01-20 PROCEDURE — 99243 OFF/OP CNSLTJ NEW/EST LOW 30: CPT | Performed by: SURGERY

## 2022-01-20 NOTE — PROGRESS NOTES
January 20, 2022    Hello, may I speak with Jeet Bustillo?    My name is CRISTINA MUNROE    I am  with [facility]  [address] [address] [phone].    Before we get started may I verify your date of birth? 1965    I am calling to officially welcome you to our practice and ask about your recent visit. Is this a good time to talk? yes    Tell me about your visit with us. What things went well?  EVERYTHING WAS GOOD.       We're always looking for ways to make our patients' experiences even better. Do you have recommendations on ways we may improve?  no    Overall were you satisfied with your first visit to our practice? yes       I appreciate you taking the time to speak with me today. Is there anything else I can do for you? no      Thank you, and have a great day.

## 2022-01-31 ENCOUNTER — HOSPITAL ENCOUNTER (OUTPATIENT)
Dept: CT IMAGING | Facility: HOSPITAL | Age: 57
Discharge: HOME | End: 2022-01-31
Admitting: SURGERY

## 2022-01-31 DIAGNOSIS — R22.1 NECK MASS: ICD-10-CM

## 2022-01-31 PROCEDURE — 70491 CT SOFT TISSUE NECK W/DYE: CPT

## 2022-01-31 PROCEDURE — 25010000002 IOPAMIDOL 61 % SOLUTION: Performed by: SURGERY

## 2022-01-31 RX ADMIN — IOPAMIDOL 100 ML: 612 INJECTION, SOLUTION INTRAVENOUS at 12:36

## 2022-02-04 ENCOUNTER — TELEPHONE (OUTPATIENT)
Dept: SURGERY | Facility: CLINIC | Age: 57
End: 2022-02-04

## 2022-02-04 NOTE — TELEPHONE ENCOUNTER
----- Message from Christian Barrow MD sent at 1/31/2022  2:59 PM EST -----  Let patient know that the CT scan was normal.  No mass appreciated.  ----- Message -----  From: Interface, Rad Results [place] In  Sent: 1/31/2022  12:57 PM EST  To: Christian Barrow MD

## 2022-04-05 ENCOUNTER — TREATMENT (OUTPATIENT)
Dept: PHYSICAL THERAPY | Facility: CLINIC | Age: 57
End: 2022-04-05
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
## 2022-04-05 DIAGNOSIS — S39.012D STRAIN OF LUMBAR REGION, SUBSEQUENT ENCOUNTER: ICD-10-CM

## 2022-04-05 PROCEDURE — 97530 THERAPEUTIC ACTIVITIES: CPT | Performed by: PHYSICAL THERAPIST

## 2022-04-05 PROCEDURE — 97161 PT EVAL LOW COMPLEX 20 MIN: CPT | Performed by: PHYSICAL THERAPIST
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
## 2023-08-16 ENCOUNTER — OFFICE VISIT (OUTPATIENT)
Dept: SURGERY | Facility: CLINIC | Age: 58
End: 2023-08-16
Payer: MEDICAID

## 2023-08-16 ENCOUNTER — PATIENT ROUNDING (BHMG ONLY) (OUTPATIENT)
Dept: SURGERY | Facility: CLINIC | Age: 58
End: 2023-08-16
Payer: MEDICAID

## 2023-08-16 VITALS
HEIGHT: 67 IN | SYSTOLIC BLOOD PRESSURE: 120 MMHG | OXYGEN SATURATION: 95 % | DIASTOLIC BLOOD PRESSURE: 88 MMHG | RESPIRATION RATE: 18 BRPM | WEIGHT: 197 LBS | BODY MASS INDEX: 30.92 KG/M2 | HEART RATE: 116 BPM | TEMPERATURE: 98 F

## 2023-08-16 DIAGNOSIS — L02.01 CUTANEOUS ABSCESS OF FACE: Primary | ICD-10-CM

## 2023-08-16 RX ORDER — LISINOPRIL 10 MG/1
10 TABLET ORAL EVERY MORNING
COMMUNITY

## 2023-08-16 RX ORDER — DILTIAZEM HYDROCHLORIDE 120 MG/1
CAPSULE, EXTENDED RELEASE ORAL
COMMUNITY
Start: 2023-07-06

## 2023-08-16 RX ORDER — ALBUTEROL SULFATE 2.5 MG/3ML
2.5 SOLUTION RESPIRATORY (INHALATION)
COMMUNITY
Start: 2023-04-24 | End: 2024-04-23

## 2023-08-16 RX ORDER — BACLOFEN 10 MG/1
TABLET ORAL
COMMUNITY

## 2023-08-16 RX ORDER — BECLOMETHASONE DIPROPIONATE 80 UG/1
AEROSOL, METERED NASAL
COMMUNITY
Start: 2023-07-21

## 2023-08-16 RX ORDER — QUETIAPINE FUMARATE 50 MG/1
50 TABLET, FILM COATED ORAL
COMMUNITY

## 2023-08-16 RX ORDER — FEXOFENADINE HCL 180 MG/1
180 TABLET ORAL DAILY
Qty: 30 TABLET | Refills: 11 | COMMUNITY
Start: 2023-07-21 | End: 2024-07-20

## 2023-08-16 RX ORDER — ALENDRONATE SODIUM 70 MG/1
TABLET ORAL
COMMUNITY

## 2023-08-16 RX ORDER — METOPROLOL SUCCINATE 25 MG/1
1 TABLET, EXTENDED RELEASE ORAL DAILY
COMMUNITY
Start: 2023-08-05

## 2023-08-16 RX ORDER — DOXYCYCLINE HYCLATE 100 MG/1
100 CAPSULE ORAL
COMMUNITY
Start: 2023-08-15

## 2023-08-16 RX ORDER — AMMONIUM LACTATE 12 G/100G
LOTION TOPICAL
COMMUNITY
Start: 2023-07-03

## 2023-08-16 NOTE — PROGRESS NOTES
August 16, 2023    Hello, may I speak with Jeet Bustillo?    My name is Mary Garcia      I am  with [facility]  [address]  [facility] [address] [phone].    Before we get started may I verify your date of birth? 1965    I am calling to officially welcome you to our practice and ask about your recent visit. Is this a good time to talk? yes    Tell me about your visit with us. What things went well?  everything was wonderful       We're always looking for ways to make our patients' experiences even better. Do you have recommendations on ways we may improve?  no    Overall were you satisfied with your first visit to our practice? yes       I appreciate you taking the time to speak with me today. Is there anything else I can do for you? no      Thank you, and have a great day.

## 2023-08-16 NOTE — PROGRESS NOTES
Patient: Jeet Bustillo    YOB: 1965    Date: 08/16/2023    Primary Care Provider: Carol Pack APRN    Chief Complaint   Patient presents with    Abscess       SUBJECTIVE:    History of present illness:  Patient is here for evaluation of an abscess on his left cheek which has been ongoing for 3-4 days.  Patient is taking Doxycycline as directed.    This is been present for several days, associated with a palpable mass, erythema, and drainage.  Located in the left jaw region.    The following portions of the patient's history were reviewed and updated as appropriate: allergies, current medications, past family history, past medical history, past social history, past surgical history and problem list.      Review of Systems   Constitutional:  Negative for chills, fever and unexpected weight change.   HENT:  Negative for trouble swallowing and voice change.    Eyes:  Negative for visual disturbance.   Respiratory:  Negative for apnea, cough, chest tightness, shortness of breath and wheezing.    Cardiovascular:  Negative for chest pain, palpitations and leg swelling.   Gastrointestinal:  Negative for abdominal distention, abdominal pain, anal bleeding, blood in stool, constipation, diarrhea, nausea, rectal pain and vomiting.   Endocrine: Negative for cold intolerance and heat intolerance.   Genitourinary:  Negative for difficulty urinating, dysuria, flank pain, scrotal swelling and testicular pain.   Musculoskeletal:  Negative for back pain, gait problem and joint swelling.   Skin:  Positive for color change and wound. Negative for rash.   Neurological:  Negative for dizziness, syncope, speech difficulty, weakness, numbness and headaches.   Hematological:  Negative for adenopathy. Does not bruise/bleed easily.   Psychiatric/Behavioral:  Negative for confusion. The patient is not nervous/anxious.      Allergies:  Allergies   Allergen Reactions    Sulfa Antibiotics Anaphylaxis    Penicillin G  Unknown - Low Severity    Penicillins Unknown (See Comments)     Pt cannot remember, he thinks he had a rash as a child    Sulfadiazine Unknown - Low Severity       Medications:    Current Outpatient Medications:     albuterol (PROVENTIL) (2.5 MG/3ML) 0.083% nebulizer solution, Inhale 2.5 mg., Disp: , Rfl:     alendronate (FOSAMAX) 70 MG tablet, TAKE ONE TABLET BY MOUTH WEEKLY ON SAME DAY IN THE MORNING. DO NOT LAY DOWN OR EAT ANYTHING ELSE FOR 30 MINUTES, Disp: , Rfl:     ammonium lactate (LAC-HYDRIN) 12 % lotion, apply to the affected area(s) twice daily, Disp: , Rfl:     baclofen (LIORESAL) 10 MG tablet, baclofen 10 mg tablet, Disp: , Rfl:     Beclomethasone Dipropionate (Qnasl) 80 MCG/ACT aerosol solution, Take 2 sprays in each nostril daily., Disp: , Rfl:     Calcium Carbonate 1500 (600 Ca) MG tablet, Take 1 tablet by mouth Daily., Disp: , Rfl:     Cholecalciferol (Vitamin D) 50 MCG (2000 UT) tablet, Take  by mouth Daily., Disp: , Rfl:     dilTIAZem SR (CARDIZEM SR) 120 MG 12 hr capsule, TAKE ONE CAPSULE BY MOUTH EVERY MORNING AND ONE capsule AT BEDTIME, Disp: , Rfl:     doxycycline (VIBRAMYCIN) 100 MG capsule, Take 1 capsule by mouth., Disp: , Rfl:     fexofenadine (ALLEGRA) 180 MG tablet, Take 1 tablet by mouth Daily., Disp: 30 tablet, Rfl: 11    ibuprofen (ADVIL,MOTRIN) 600 MG tablet, As Needed., Disp: , Rfl:     lisinopril (PRINIVIL,ZESTRIL) 10 MG tablet, Take 1 tablet by mouth Every Morning., Disp: , Rfl:     metoprolol succinate XL (TOPROL-XL) 25 MG 24 hr tablet, Take 1 tablet by mouth Daily., Disp: , Rfl:     montelukast (SINGULAIR) 10 MG tablet, Take 1 tablet by mouth Daily., Disp: , Rfl:     omeprazole (priLOSEC) 20 MG capsule, TAKE ONE CAPSULE BY MOUTH EVERY MORNING 30 TO 60 MINUTES BEFORE A MEAL, Disp: , Rfl:     ProAir  (90 Base) MCG/ACT inhaler, As Needed., Disp: , Rfl:     QUEtiapine (SEROquel) 50 MG tablet, Take 1 tablet by mouth every night at bedtime., Disp: , Rfl:     Symbicort 160-4.5  "MCG/ACT inhaler, As Needed., Disp: , Rfl:     History:  Past Medical History:   Diagnosis Date    Osteoporosis        Past Surgical History:   Procedure Laterality Date    APPENDECTOMY         Family History   Problem Relation Age of Onset    Lymphoma Mother     Lung cancer Father        Social History     Tobacco Use    Smoking status: Former     Packs/day: 2.00     Years: 25.00     Pack years: 50.00     Types: Cigarettes    Smokeless tobacco: Never   Vaping Use    Vaping Use: Never used   Substance Use Topics    Alcohol use: No    Drug use: No        OBJECTIVE:    Vital Signs:   Vitals:    08/16/23 1409   BP: 120/88   BP Location: Right arm   Pulse: 116   Resp: 18   Temp: 98 øF (36.7 øC)   TempSrc: Temporal   SpO2: 95%   Weight: 89.4 kg (197 lb)   Height: 170.2 cm (67\")       Physical Exam:   General Appearance:    Alert, cooperative, in no acute distress   Head:    Normocephalic, without obvious abnormality, atraumatic   Eyes:            Lids and lashes normal, conjunctivae and sclerae normal, no   icterus, no pallor, corneas clear, PERRLA   Ears:    Ears appear intact with no abnormalities noted   Throat:   No oral lesions, no thrush, oral mucosa moist   Neck:   No adenopathy, supple, trachea midline, no thyromegaly, no   carotid bruit, no JVD   Lungs:     Clear to auscultation,respirations regular, even and                  unlabored    Heart:    Regular rhythm and normal rate, normal S1 and S2, no            murmur, no gallop, no rub, no click   Chest Wall:    No abnormalities observed   Abdomen:     Normal bowel sounds, no masses, no organomegaly, soft        non-tender, non-distended, no guarding, no rebound                tenderness   Extremities:   Moves all extremities well, no edema, no cyanosis, no             redness   Pulses:   Pulses palpable and equal bilaterally   Skin:   No bleeding, bruising or rash, obvious abscess at the angle of the left jaw   Lymph nodes:   No palpable adenopathy "   Neurologic:   Cranial nerves 2 - 12 grossly intact, sensation intact, DTR       present and equal bilaterally     Results Review:   I reviewed the patient's new clinical results.  I reviewed the patient's new imaging results and agree with the interpretation.  I reviewed the patient's other test results and agree with the interpretation    Review of Systems was reviewed and confirmed as accurate as documented by the MA.    ASSESSMENT/PLAN:    1. Cutaneous abscess of face        Procedure:     I recommended abscess drainage to the patient. I explained the indication as well as the risks and benefits which include bleeding, further infection requiring additional procedures, non healing of the wound etc. The patient understands these and wishes to proceed.      The patient was brought to the procedure room. Consent and time out were performed. The area was prepped and draped in the usual fashion. 1% lidocaine with epinephrine was infused locally. The abscess was then incised and drained sharply with a #11 blade. Purulent contents were evacuated and irrigated with saline and peroxide. Minimal blood loss had occurred and was well controlled with pressure. There were no complications and the patient tolerated the procedure well. Wound instructions were given.  This was complicated nature and look to be an infected sebaceous cyst.    I discussed the patients findings and my recommendations with patient        Electronically signed by Carlton Resendiz MD  08/16/23